# Patient Record
Sex: MALE
[De-identification: names, ages, dates, MRNs, and addresses within clinical notes are randomized per-mention and may not be internally consistent; named-entity substitution may affect disease eponyms.]

---

## 2019-02-08 ENCOUNTER — HOSPITAL ENCOUNTER (INPATIENT)
Dept: HOSPITAL 92 - ERS | Age: 69
LOS: 2 days | Discharge: HOME | DRG: 65 | End: 2019-02-10
Attending: FAMILY MEDICINE | Admitting: FAMILY MEDICINE
Payer: MEDICARE

## 2019-02-08 VITALS — BODY MASS INDEX: 29.3 KG/M2

## 2019-02-08 DIAGNOSIS — Z88.8: ICD-10-CM

## 2019-02-08 DIAGNOSIS — E78.5: ICD-10-CM

## 2019-02-08 DIAGNOSIS — E11.22: ICD-10-CM

## 2019-02-08 DIAGNOSIS — E87.1: ICD-10-CM

## 2019-02-08 DIAGNOSIS — I12.9: ICD-10-CM

## 2019-02-08 DIAGNOSIS — T50.995A: ICD-10-CM

## 2019-02-08 DIAGNOSIS — I63.9: Primary | ICD-10-CM

## 2019-02-08 DIAGNOSIS — Z98.52: ICD-10-CM

## 2019-02-08 DIAGNOSIS — N18.3: ICD-10-CM

## 2019-02-08 DIAGNOSIS — G81.91: ICD-10-CM

## 2019-02-08 LAB
ALBUMIN SERPL BCG-MCNC: 4.9 G/DL (ref 3.4–4.8)
ALP SERPL-CCNC: 80 U/L (ref 40–150)
ALT SERPL W P-5'-P-CCNC: 13 U/L (ref 8–55)
ANION GAP SERPL CALC-SCNC: 18 MMOL/L (ref 10–20)
APTT PPP: 29.1 SEC (ref 22.9–36.1)
AST SERPL-CCNC: 22 U/L (ref 5–34)
BASOPHILS # BLD AUTO: 0.1 THOU/UL (ref 0–0.2)
BASOPHILS NFR BLD AUTO: 0.7 % (ref 0–1)
BILIRUB SERPL-MCNC: 0.5 MG/DL (ref 0.2–1.2)
BUN SERPL-MCNC: 28 MG/DL (ref 8.4–25.7)
CALCIUM SERPL-MCNC: 9.7 MG/DL (ref 7.8–10.44)
CHLORIDE SERPL-SCNC: 106 MMOL/L (ref 98–107)
CO2 SERPL-SCNC: 18 MMOL/L (ref 23–31)
CREAT CL PREDICTED SERPL C-G-VRATE: 0 ML/MIN (ref 70–130)
EOSINOPHIL # BLD AUTO: 0.5 THOU/UL (ref 0–0.7)
EOSINOPHIL NFR BLD AUTO: 5.2 % (ref 0–10)
GLOBULIN SER CALC-MCNC: 2.9 G/DL (ref 2.4–3.5)
GLUCOSE SERPL-MCNC: 131 MG/DL (ref 80–115)
HGB BLD-MCNC: 15.9 G/DL (ref 14–18)
INR PPP: 1
LYMPHOCYTES # BLD: 2.8 THOU/UL (ref 1.2–3.4)
LYMPHOCYTES NFR BLD AUTO: 27.2 % (ref 21–51)
MCH RBC QN AUTO: 29.7 PG (ref 27–31)
MCV RBC AUTO: 87.9 FL (ref 78–98)
MONOCYTES # BLD AUTO: 0.5 THOU/UL (ref 0.11–0.59)
MONOCYTES NFR BLD AUTO: 5.1 % (ref 0–10)
NEUTROPHILS # BLD AUTO: 6.4 THOU/UL (ref 1.4–6.5)
NEUTROPHILS NFR BLD AUTO: 61.8 % (ref 42–75)
PLATELET # BLD AUTO: 264 THOU/UL (ref 130–400)
POTASSIUM SERPL-SCNC: 4.9 MMOL/L (ref 3.5–5.1)
PROTHROMBIN TIME: 13.4 SEC (ref 12–14.7)
RBC # BLD AUTO: 5.37 MILL/UL (ref 4.7–6.1)
SODIUM SERPL-SCNC: 137 MMOL/L (ref 136–145)
WBC # BLD AUTO: 10.4 THOU/UL (ref 4.8–10.8)

## 2019-02-08 PROCEDURE — 36416 COLLJ CAPILLARY BLOOD SPEC: CPT

## 2019-02-08 PROCEDURE — 80061 LIPID PANEL: CPT

## 2019-02-08 PROCEDURE — 84484 ASSAY OF TROPONIN QUANT: CPT

## 2019-02-08 PROCEDURE — 80048 BASIC METABOLIC PNL TOTAL CA: CPT

## 2019-02-08 PROCEDURE — 85610 PROTHROMBIN TIME: CPT

## 2019-02-08 PROCEDURE — 85025 COMPLETE CBC W/AUTO DIFF WBC: CPT

## 2019-02-08 PROCEDURE — 80053 COMPREHEN METABOLIC PANEL: CPT

## 2019-02-08 PROCEDURE — 36415 COLL VENOUS BLD VENIPUNCTURE: CPT

## 2019-02-08 PROCEDURE — 93005 ELECTROCARDIOGRAM TRACING: CPT

## 2019-02-08 PROCEDURE — 93306 TTE W/DOPPLER COMPLETE: CPT

## 2019-02-08 PROCEDURE — 70450 CT HEAD/BRAIN W/O DYE: CPT

## 2019-02-08 PROCEDURE — 85730 THROMBOPLASTIN TIME PARTIAL: CPT

## 2019-02-08 PROCEDURE — 93880 EXTRACRANIAL BILAT STUDY: CPT

## 2019-02-08 PROCEDURE — 70551 MRI BRAIN STEM W/O DYE: CPT

## 2019-02-08 NOTE — CT
CT BRAIN WITHOUT CONTRAST:

2/8/19

 

HISTORY: 

Altered mental status. Right sided weakness and slurred speech. 

 

No acute territorial infarct or hemorrhage. No midline shift or mass effect. Ventricular size and ext
ra-axial CSF spaces are normal for age. Mild atrophy. 

 

The paranasal sinuses and mastoids are clear. The calvarium is intact. 

 

IMPRESSION:  

No acute intracranial abnormality. 

 

POS: [facility]

## 2019-02-09 LAB
ANION GAP SERPL CALC-SCNC: 12 MMOL/L (ref 10–20)
BASOPHILS # BLD AUTO: 0.1 THOU/UL (ref 0–0.2)
BASOPHILS NFR BLD AUTO: 0.9 % (ref 0–1)
BUN SERPL-MCNC: 28 MG/DL (ref 8.4–25.7)
CALCIUM SERPL-MCNC: 9.5 MG/DL (ref 7.8–10.44)
CHD RISK SERPL-RTO: 5.4 (ref ?–4.5)
CHLORIDE SERPL-SCNC: 105 MMOL/L (ref 98–107)
CHOLEST SERPL-MCNC: 182 MG/DL
CO2 SERPL-SCNC: 22 MMOL/L (ref 23–31)
CREAT CL PREDICTED SERPL C-G-VRATE: 45 ML/MIN (ref 70–130)
EOSINOPHIL # BLD AUTO: 0.7 THOU/UL (ref 0–0.7)
EOSINOPHIL NFR BLD AUTO: 6.7 % (ref 0–10)
GLUCOSE SERPL-MCNC: 121 MG/DL (ref 80–115)
HDLC SERPL-MCNC: 34 MG/DL
HGB BLD-MCNC: 13.9 G/DL (ref 14–18)
LDLC SERPL CALC-MCNC: 107 MG/DL
LYMPHOCYTES # BLD: 2.9 THOU/UL (ref 1.2–3.4)
LYMPHOCYTES NFR BLD AUTO: 30.3 % (ref 21–51)
MCH RBC QN AUTO: 29.2 PG (ref 27–31)
MCV RBC AUTO: 86.9 FL (ref 78–98)
MONOCYTES # BLD AUTO: 0.6 THOU/UL (ref 0.11–0.59)
MONOCYTES NFR BLD AUTO: 6.6 % (ref 0–10)
NEUTROPHILS # BLD AUTO: 5.4 THOU/UL (ref 1.4–6.5)
NEUTROPHILS NFR BLD AUTO: 55.5 % (ref 42–75)
PLATELET # BLD AUTO: 245 THOU/UL (ref 130–400)
POTASSIUM SERPL-SCNC: 4 MMOL/L (ref 3.5–5.1)
RBC # BLD AUTO: 4.74 MILL/UL (ref 4.7–6.1)
SODIUM SERPL-SCNC: 135 MMOL/L (ref 136–145)
TRIGL SERPL-MCNC: 206 MG/DL (ref ?–150)
WBC # BLD AUTO: 9.7 THOU/UL (ref 4.8–10.8)

## 2019-02-09 RX ADMIN — INSULIN LISPRO PRN UNIT: 100 INJECTION, SOLUTION INTRAVENOUS; SUBCUTANEOUS at 19:14

## 2019-02-09 RX ADMIN — HEPARIN SODIUM SCH UNITS: 5000 INJECTION, SOLUTION INTRAVENOUS; SUBCUTANEOUS at 20:24

## 2019-02-09 RX ADMIN — INSULIN LISPRO PRN UNIT: 100 INJECTION, SOLUTION INTRAVENOUS; SUBCUTANEOUS at 15:51

## 2019-02-09 RX ADMIN — HEPARIN SODIUM SCH UNITS: 5000 INJECTION, SOLUTION INTRAVENOUS; SUBCUTANEOUS at 09:10

## 2019-02-09 NOTE — MRI
MRI OF THE BRAIN WITHOUT CONTRAST:

 

COMPARISON: 

None.

 

HISTORY: 

Left leg foot drop and loss of balance.  Slurred speech.

 

TECHNIQUE: 

Multiplanar, multisequence MR images were obtained of the brain without contrast.

 

FINDINGS: 

There is an area of high FLAIR signal and restricted diffusion in the left cerebral peduncle consiste
nt with an acute infarction.  There are no other areas of restricted diffusion.

 

There is no evidence of hydrocephalus, intracranial hemorrhage, or extraaxial fluid collection.  The 
expected flow voids are present.  The corpus callosum, pituitary, and craniocervical junction are unr
emarkable.

 

IMPRESSION: 

Acute infarction in the left cerebral peduncle.  

 

POS: [facility]

## 2019-02-09 NOTE — PRG
DATE OF SERVICE:  02/09/2019



SUBJECTIVE:  The patient denies any new focal deficit.  No headaches, seizures, or

double vision reported.  Slurring of speech is improving. 



REVIEW OF SYSTEMS:  No nausea, vomiting, chest pain, or palpitations.  All other

review of systems were reviewed and were found negative. 



PHYSICAL EXAMINATION:

VITAL SIGNS:  Temperature 98.4, pulse rate of 90, blood pressure 168/95, O2

saturation 96% on room air, and respiration of 18. 

GENERAL:  69-year-old male, in no apparent distress.  He denies any chest pain. 

NECK:  Supple.  No JVD.  No carotid bruit. 

LUNGS:  Clear to auscultation bilaterally.  No wheezing, rales, or rhonchi. 

HEART:  S1 and S2 present.  Regular rate and rhythm.  No murmurs, rubs, or gallops

appreciated. 

ABDOMEN:  Soft, nontender.  Bowel sounds present. 

NEUROLOGIC:  Right upper and right lower extremity strength is 4/5.  Sensation to

touch was normal bilaterally.  Minimal facial droop.  No other focal deficit. 

PSYCHIATRY:  Alert, awake, and oriented x3.



LABORATORY DATA:  , cholesterol 182, triglyceride 206. 



WBC 9.7, hemoglobin 13.9, INR 1.0.  Sodium 135, creatinine 1.63, BUN 28. 



Troponin negative. 



MRI of the brain by my review showed acute infarction in the left cerebral peduncle.

 Carotid Doppler was negative for hemodynamically significant stenosis. 



Telemetry monitoring by my review showed sinus rhythm.



IMPRESSION:  

1. 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

2. Dyslipidemia with statin intolerance.

3. Diabetes mellitus type 2.

4. Hypertension.

5. Chronic kidney disease stage 3.



PLAN:  The patient has been seen by Neurology.  I also discussed the case with Dr. Holm.  The patient takes aspirin 81 mg daily.  Dr. Holm advised to increase

aspirin to 325 mg daily.  We will continue heparin for DVT prophylaxis.  No statin

due to intolerance.  We will resume Inderal at low dose.  We will gradually resume

his other antihypertensives.  Continue diabetic diet.  P.r.n. antihypertensives for

blood pressure more than 180.  Continue glipizide at low dose.  We will resume

amlodipine in a.m.  We will discontinue metformin. 



Plan was discussed with the patient.  He stated understanding.







Job ID:  [id number]

## 2019-02-09 NOTE — ULT
CAROTID DUPLEX SONOGRAM:

 

HISTORY: 

CVA.  Vascular disease.

 

FINDINGS: 

 

RIGHT:

No significant plaque.  Color and spectral Doppler evaluation, peak systolic velocity 44 cm/s, and IC
 to CC ratio suggests no hemodynamically significant stenosis within the extracranial ICA.  Antegrade
 flow within the vertebral artery.

 

LEFT:

No significant plaque visible.  Color and spectral Doppler evaluation, peak systolic velocity of 56 c
m/s, and IC to CC ratio of 0.6 suggests no hemodynamically significant stenosis within the extracrani
al left ICA.  Antegrade flow is present within the vertebral artery.

 

IMPRESSION: 

No significant plaque apparent.  No sonographic evidence of significant extracranial internal carotid
 artery stenosis.

 

POS: [facility]

## 2019-02-09 NOTE — CON
DATE OF CONSULTATION:  



CHIEF COMPLAINT:  Right-sided weakness.



HISTORY OF PRESENT ILLNESS:  The patient is a 69-year-old man, who reports he has

developed difficulties with his right arm on Monday.  He could not write.  He was

unable to keep his arm steady.  He was dragging his right foot at work.  He never

had a stroke, therefore decided he needed to come in.  He also developed some speech

problems, which were mild.  The patient reports no symptoms on the left side.  He

never had a prior stroke.  He has hypertension, diabetes, and hypercholesterolemia,

which are risk factors for him.  No vision problems.  No dizziness. 



PREVIOUS MEDICAL HISTORY:  Positive for diabetes, hypertension, and

hypercholesterolemia.  He was unable to take statins due to knee pain and weakness

of his legs. 



FAMILY HISTORY:  Negative for CVA.  Father  at 65 from a lymphoma.  Mother 

at 48 from tuberculosis.  He has 4 brothers. 



SOCIAL HISTORY:  He is a nonsmoker.  Does not drink alcohol.  He is a  by

trade, but retired in 2016. 



REVIEW OF SYSTEMS:  PULMONARY:  Negative for any shortness of breath or cough. 

CARDIAC:  Negative for chest pain or dizziness or palpitations. 

NEUROLOGICAL:  Positive for "incoordination" in the right side and difficulty with

walking. 

DERMATOLOGIC:  Negative for any rash or skin changes. 

HEMATOLOGIC:  Negative for any clotting disorder or bleeding diathesis. 

ENDOCRINE:  Positive for diabetes. 

OPHTHALMOLOGIC:  Negative for any vision changes.



PHYSICAL EXAMINATION:

VITAL SIGNS:  Blood pressure is 170/85, temperature 98.5, pulse 73, and respiratory

rate 16. 

GENERAL APPEARANCE:  Thin built, well-nourished gentleman, who is very pleasant. 

CHEST:  Clear vesicular breathing. 

CARDIOVASCULAR:  S1 and S2 heard.  No murmurs. 

ABDOMEN:  Soft. 

NEUROLOGICAL:  Higher intellectual functions normal.  Orientation to time, place,

and person and appropriate conversation.  Cranial nerves; pupils are 3 mm, reactive.

 Normal extraocular movements.  He had facial droop on the right side with

asymmetry.  Palate elevates normally.  Tongue is midline.  Normal hearing to finger

rub.  Normal sensation of face bilaterally.  Motor exam; bulk normal.  Tone normal.

Strength 5/5 in iliopsoas, hamstrings, quadriceps, ankle dorsiflexion, plantar

flexion, deltoid, triceps, biceps, wrist extension and flexion, and finger extension

and flexion bilaterally.  Cerebellar exam; he has asymmetry with incoordination on

the right side.  Deep tendon reflexes were 2+ in upper extremities, absent knee jerk

on the right, 2+ in ankles, and 1+ in the left knee date.  Gait, he has mild

difficulty with walking, mostly with right side.  Cerebellar, incoordination on the

right side.  Sensory, normal to touch and proprioception. 



CURRENT LABORATORY WORKUP:  White count 9.7, hemoglobin 13.9, hematocrit 41.2, and

platelets 245.  Chemistry; sodium 135, potassium 4.0, chloride 105, BUN is 28,

creatinine 1.63, and glucose 121.  Triglycerides 206, cholesterol 182, , HDL

34, heart disease risk ratio 5.4.  PT 13.4, INR 1.0, and PTT 29.1. 



IMAGING DATA:  His brain MRI scan showed no acute infarction in the left cerebral

peduncle and carotid Doppler showed no significant plaque.  No sonographic evidence

of extracranial internal carotid artery stenosis.  Brain CT was done and that showed

no acute intracranial abnormality. 



IMPRESSION:  The patient is a 69-year-old man, who reports right-sided abnormalities

and he has had no dysarthria or vision problems.  His MRI shows stroke in the left

cerebral peduncle, which is consistent with the findings we are seen.  This is

likely due to small vessel ischemic event from diabetes or hypertension. 



RECOMMENDATIONS:  Control of risk factors.  The patient education about hypertension

management and consider another agent other than statin for his cholesterol, aspirin

for stroke prophylaxis.  I will continue to follow him and review his stroke workup

including echocardiogram. 





Job ID:  [id number]

## 2019-02-09 NOTE — HP
CHIEF COMPLAINT:  Right upper extremity and lower extremity weakness.



PRIMARY CARE PHYSICIAN:  Patient unable to recall the name.



HISTORY OF PRESENT ILLNESS:  Mr. Rivera is a 69-year-old male with past medical

history of hypertension, diabetes type 2, and hypercholesteremia, presents to 
the

emergency department for weakness of his right upper extremity and right lower

extremity for 5 days.  Symptoms started on Monday and has been all improving.  
The

patient reports that he was dragging his right lower extremity.  He also noted 
that

his right hand was weaker.  He denies any numbness at all.  The patient also 
denies

any chest pain, shortness of breath, or abdominal pain.  The patient reports 
that he

is taking metformin and glipizide for his diabetes.  The patient reported that 
he

has been on statin in the past and he developed muscle cramps.  He was taken 
off and

then put back in, he still had the same problem, and he was taken off 
permanently. 



The patient denies any history of stroke.



PAST MEDICAL HISTORY:  Diabetes, hyperlipidemia.



PAST SURGICAL HISTORY:  Vasectomy.



SOCIAL HISTORY:  Denies smoking, drinking alcohol, or use of any illicit drugs.



ALLERGIES:  STATIN.  THE PATIENT REPORTS CRAMPS IN HIS LOWER EXTREMITIES WITH

STATINS. 



FAMILY HISTORY:  The patient reports that his father  of cancer.



REVIEW OF SYSTEMS:  10-point review of system negative other than mentioned in 
the

HPI. 



PHYSICAL EXAMINATION:

VITAL SIGNS:  Blood pressure 148/83, pulse 63, respiratory rate 18, temperature

97.7, and O2 saturation 95% on room air. 

CONSTITUTIONAL:  Alert and oriented. 

HEENT:  Head is atraumatic.  Ears and nose exam grossly normal without any

abnormality noted.  Throat and mouth, no exudate noted. 

NECK:  No lymphadenopathy noted. 

CARDIOVASCULAR:  No murmur, rubs, or gallops.  Regular rate and rhythm. 

RESPIRATORY:  No wheezes or rales noted.  Clear bilaterally. 

ABDOMEN:  Bowel sounds positive.  Soft and nontender. 

EXTREMITIES:  No edema noted. 

NEUROLOGIC:  Cranial nerve intact grossly.  The patient is alert and oriented 
x3.

Sensation intact bilaterally.  Strength, right upper extremity and right lower

extremity 4/5, left upper and lower extremity strength fully intact. 

PSYCH:  Mood appears to be normal. 

SKIN:  No abdominal rashes noted. 



CT head negative for acute CVA.  EKG reviewed to be normal sinus rhythm.



LABORATORY DATA:  Labs reviewed.  His BMP is significant for creatinine of 1.68,

which appeared to be around his baseline.  CBC unremarkable and coagulation lab 
also

unremarkable as well. 



ASSESSMENT AND PLAN:  

1. Right upper and lower extremity weakness, likely concerning for 
cerebrovascular

accident.  The patient did not receive tPA because he was outside the 
therapeutic

window since the symptoms started around 5 days ago.  Neurology consulted.  CT 
head

within normal limits with no acute stroke noted.  Ultrasound of carotid

echocardiogram ordered.  Neuro consulted.  The patient did pass all of the

evaluation per RN, so we will start the patient's diet.  Lipid panel in the 
morning.

 Fall precautions at this point.  The patient was given aspirin and we will 
start

the patient on aspirin. 

2. Hypertension.  The patient is known to be hypertensive.  Unclear if the 
patient

is on any blood pressure medication.  We will allow permissive hypertension and 
will

have hydralazine p.r.n. 

3. Chronic kidney disease stage 3.  The patient appears to be around his 
baseline.

4. Diabetes 2.  We will hold the patient's p.o. medication and will manage with

low-dose sliding scale insulin while in-house. 

5. Hyperlipidemia.  Unable to start his statin due to suspected history of

rhabdomyolysis.  Lipid panel in the morning.  Consider fenofibrate or 
gemfibrozil

once the lipid panel has resulted. 

6. Deep venous thrombosis prophylaxis addressed.  

The patient is full code.  Medical power of .  The patient wants his 
children to make decision for him if he is

not able to.  He is . 

7. CKD III: Stable







Job ID:  [id number]



[facility]

## 2019-02-10 VITALS — TEMPERATURE: 98.4 F | DIASTOLIC BLOOD PRESSURE: 82 MMHG | SYSTOLIC BLOOD PRESSURE: 139 MMHG

## 2019-02-10 RX ADMIN — HEPARIN SODIUM SCH UNITS: 5000 INJECTION, SOLUTION INTRAVENOUS; SUBCUTANEOUS at 09:02

## 2019-02-10 RX ADMIN — INSULIN LISPRO PRN UNIT: 100 INJECTION, SOLUTION INTRAVENOUS; SUBCUTANEOUS at 12:47

## 2019-02-10 NOTE — DIS
DATE OF ADMISSION:  02/08/2019



DATE OF DISCHARGE:  02/10/2019



DISCHARGE DISPOSITION:  Home.



FOLLOWUP:  

1. Follow up with primary care physician, Dr. Estrada, in 1 week.

2. Follow up with Dr. Gunter, Neurology, in 2 weeks.



ALLERGIES:  THE PATIENT CANNOT TOLERATE STATINS. 



THE PATIENT WAS SEEN AND EXAMINED ON THE DAY OF DISCHARGE.  DENIES ANY NEW

COMPLAINTS.  NO NEW FOCAL DEFICIT. 



BRIEF HOSPITAL COURSE:  The patient is a 69-year-old male with hypertension;

hyperlipidemia; and diabetes mellitus, type 2, presented to the hospital with

right-sided weakness.  His workup was consistent with acute CVA involving the 
left

cerebral peduncle.  Echocardiogram showed normal left ventricular ejection 
fraction

of 55% to 60% with diastolic dysfunction and mild mitral and tricuspid

regurgitation.  Carotid Doppler was negative for hemodynamically significant

stenosis.  Lifestyle modification was emphasized.  His triglyceride was 206 with

cholesterol 182, , and HDL of 34.  Please note that, the patient is 
unable to

tolerate statins.  He was advised to follow up with his primary care physician.

Outpatient occupation therapy has been arranged. Aspirin has been increased to 
325

mg daily per Neurology recommendation. All other home medications were left 
unchanged.



FINAL DIAGNOSES:  

1. 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

2. Dyslipidemia with statin intolerance.

3. Diabetes mellitus, type 2. 

4. Hypertension.

5. Chronic kidney disease, stage 3.

6. Mild hyponatremia.



PLAN:  Plan of care was discussed with the patient in detail, he stated

understanding. 





Job ID:  [id number]



[facility]